# Patient Record
Sex: MALE | Race: BLACK OR AFRICAN AMERICAN | NOT HISPANIC OR LATINO | Employment: FULL TIME | URBAN - METROPOLITAN AREA
[De-identification: names, ages, dates, MRNs, and addresses within clinical notes are randomized per-mention and may not be internally consistent; named-entity substitution may affect disease eponyms.]

---

## 2021-05-04 PROBLEM — S93.401A RIGHT ANKLE SPRAIN: Status: ACTIVE | Noted: 2021-05-04

## 2021-05-04 PROBLEM — M25.571 ACUTE RIGHT ANKLE PAIN: Status: ACTIVE | Noted: 2021-05-04

## 2021-05-06 PROBLEM — R29.898 DIFFICULTY IN WEIGHT BEARING: Status: ACTIVE | Noted: 2021-05-06

## 2021-10-21 PROBLEM — M25.462 SWELLING OF LEFT KNEE JOINT: Status: ACTIVE | Noted: 2021-10-21

## 2021-10-21 PROBLEM — M25.562 ACUTE PAIN OF LEFT KNEE: Status: ACTIVE | Noted: 2021-10-21

## 2021-10-21 PROBLEM — S83.512A SPRAIN OF ANTERIOR CRUCIATE LIGAMENT OF LEFT KNEE: Status: ACTIVE | Noted: 2021-10-21

## 2021-10-21 PROBLEM — R52 PAIN AGGRAVATED BY WALKING: Status: ACTIVE | Noted: 2021-10-21

## 2021-10-21 PROBLEM — S83.412A SPRAIN OF MEDIAL COLLATERAL LIGAMENT OF LEFT KNEE: Status: ACTIVE | Noted: 2021-10-21

## 2021-10-21 PROBLEM — R52 PAIN AGGRAVATED BY WALKING: Status: RESOLVED | Noted: 2021-10-21 | Resolved: 2021-10-21

## 2021-12-01 ENCOUNTER — NURSE TRIAGE (OUTPATIENT)
Dept: ADMINISTRATIVE | Facility: CLINIC | Age: 31
End: 2021-12-01

## 2024-02-01 PROBLEM — M71.21 BAKER'S CYST OF KNEE, RIGHT: Status: ACTIVE | Noted: 2024-02-01

## 2024-02-01 PROBLEM — M25.561 CHRONIC PAIN OF RIGHT KNEE: Status: ACTIVE | Noted: 2024-02-01

## 2024-02-01 PROBLEM — G89.29 CHRONIC PAIN OF RIGHT KNEE: Status: ACTIVE | Noted: 2024-02-01

## 2025-01-22 ENCOUNTER — OUTPATIENT CASE MANAGEMENT (OUTPATIENT)
Dept: ADMINISTRATIVE | Facility: OTHER | Age: 35
End: 2025-01-22

## 2025-01-22 NOTE — PROGRESS NOTES
Sw received a referral to assist with [facility] resources. The [facility] had received a consult to see Patient. However, the Clinic is unable to see him at this time. Sw mailed Patient a letter explaining this. He was provided the contact information for his local mental health center. Sw encouraged Patient to contact them to schedule an assessment if he was still in need of services.    Sheron Willingham LCSW    [phone]